# Patient Record
Sex: MALE | Race: WHITE | HISPANIC OR LATINO
[De-identification: names, ages, dates, MRNs, and addresses within clinical notes are randomized per-mention and may not be internally consistent; named-entity substitution may affect disease eponyms.]

---

## 2020-04-04 ENCOUNTER — TRANSCRIPTION ENCOUNTER (OUTPATIENT)
Age: 40
End: 2020-04-04

## 2020-05-09 ENCOUNTER — TRANSCRIPTION ENCOUNTER (OUTPATIENT)
Age: 40
End: 2020-05-09

## 2022-08-18 ENCOUNTER — APPOINTMENT (OUTPATIENT)
Dept: ORTHOPEDIC SURGERY | Facility: CLINIC | Age: 42
End: 2022-08-18

## 2022-08-18 VITALS — HEIGHT: 70 IN | BODY MASS INDEX: 27.92 KG/M2 | WEIGHT: 195 LBS

## 2022-08-18 DIAGNOSIS — Z78.9 OTHER SPECIFIED HEALTH STATUS: ICD-10-CM

## 2022-08-18 DIAGNOSIS — Z00.00 ENCOUNTER FOR GENERAL ADULT MEDICAL EXAMINATION W/OUT ABNORMAL FINDINGS: ICD-10-CM

## 2022-08-18 DIAGNOSIS — M24.9 JOINT DERANGEMENT, UNSPECIFIED: ICD-10-CM

## 2022-08-18 PROCEDURE — 73080 X-RAY EXAM OF ELBOW: CPT | Mod: RT

## 2022-08-18 PROCEDURE — 99204 OFFICE O/P NEW MOD 45 MIN: CPT

## 2022-08-18 PROCEDURE — 99072 ADDL SUPL MATRL&STAF TM PHE: CPT
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
## 2022-08-18 NOTE — DISCUSSION/SUMMARY
[de-identified] : 41m s/p work injury 08.12.22 with left elbow pain/ bruising. \par 1) stat mri left elbow, eval distal biceps \par 2) cryotherapy, rest and activity modification\par 3) OOW\par 4) d/c use of  the sling\par 5) rtc after MRI \par \par Entered by Monica Temple acting as scribe.\par

## 2022-08-18 NOTE — PHYSICAL EXAM
[Left] : left elbow [] : no laceration/abrasion [FreeTextEntry9] : limited due to immoblization  [de-identified] : +hook test

## 2022-08-18 NOTE — WORK
[Sprain/Strain] : sprain/strain [Was the competent medical cause of the injury] : was the competent medical cause of the injury [Are consistent with the injury] : are consistent with the injury [Consistent with my objective findings] : consistent with my objective findings [Total] : total [Does not reveal pre-existing condition(s) that may affect treatment/prognosis] : does not reveal pre-existing condition(s) that may affect treatment/prognosis [Cannot return to work because ________] : cannot return to work because [unfilled] [Patient] : patient [No Rx restrictions] : No Rx restrictions. [I provided the services listed above] :  I provided the services listed above.

## 2022-08-24 ENCOUNTER — FORM ENCOUNTER (OUTPATIENT)
Age: 42
End: 2022-08-24

## 2022-08-24 ENCOUNTER — APPOINTMENT (OUTPATIENT)
Dept: ORTHOPEDIC SURGERY | Facility: CLINIC | Age: 42
End: 2022-08-24

## 2022-08-25 ENCOUNTER — APPOINTMENT (OUTPATIENT)
Dept: MRI IMAGING | Facility: CLINIC | Age: 42
End: 2022-08-25

## 2022-08-25 PROCEDURE — 73221 MRI JOINT UPR EXTREM W/O DYE: CPT | Mod: LT

## 2022-08-25 PROCEDURE — 99072 ADDL SUPL MATRL&STAF TM PHE: CPT

## 2022-08-31 ENCOUNTER — APPOINTMENT (OUTPATIENT)
Dept: ORTHOPEDIC SURGERY | Facility: CLINIC | Age: 42
End: 2022-08-31

## 2022-08-31 PROCEDURE — 99214 OFFICE O/P EST MOD 30 MIN: CPT

## 2022-08-31 PROCEDURE — 99072 ADDL SUPL MATRL&STAF TM PHE: CPT
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
## 2022-08-31 NOTE — DISCUSSION/SUMMARY
[de-identified] : 41m s/p work injury 08.12.22 with partial tear distal biceps left elbow\par 1) start physical therapy \par 2) cryotherapy, rest and activity modification\par 3) remain oow\par 4) rtc 4 weeks\par \par \par Entered by Monica Temple acting as scribe.\par

## 2022-08-31 NOTE — PHYSICAL EXAM
[Left] : left elbow [] : no laceration/abrasion [FreeTextEntry9] : limited due to immoblization  [de-identified] : +hook test

## 2022-09-28 ENCOUNTER — APPOINTMENT (OUTPATIENT)
Dept: ORTHOPEDIC SURGERY | Facility: CLINIC | Age: 42
End: 2022-09-28

## 2022-09-28 VITALS — HEIGHT: 70 IN | BODY MASS INDEX: 27.92 KG/M2 | WEIGHT: 195 LBS

## 2022-09-28 PROCEDURE — 99214 OFFICE O/P EST MOD 30 MIN: CPT

## 2022-09-28 PROCEDURE — 99072 ADDL SUPL MATRL&STAF TM PHE: CPT

## 2022-09-28 NOTE — WORK
[Sprain/Strain] : sprain/strain [Was the competent medical cause of the injury] : was the competent medical cause of the injury [Are consistent with the injury] : are consistent with the injury [Consistent with my objective findings] : consistent with my objective findings [Does not reveal pre-existing condition(s) that may affect treatment/prognosis] : does not reveal pre-existing condition(s) that may affect treatment/prognosis [Patient] : patient [No Rx restrictions] : No Rx restrictions. [I provided the services listed above] :  I provided the services listed above. [Partial] : partial

## 2022-09-28 NOTE — PHYSICAL EXAM
[Left] : left elbow [] : palpable distal biceps [Pain with Pronation] : pain with pronation [Pain with Supination] : pain with supination [de-identified] : +hook test

## 2022-09-28 NOTE — DISCUSSION/SUMMARY
[de-identified] : 41m s/p work injury 08.12.22 with partial tear distal biceps left elbow. pt presently working light duty. \par 1) c/w physical therapy\par 2) cryotherapy, rest and activity modification\par 3) c/w light duty\par 4) rtc 6 weeks\par \par Entered by Monica Temple acting as scribe.\par
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
## 2022-11-02 ENCOUNTER — APPOINTMENT (OUTPATIENT)
Dept: ORTHOPEDIC SURGERY | Facility: CLINIC | Age: 42
End: 2022-11-02

## 2022-11-02 VITALS — BODY MASS INDEX: 27.92 KG/M2 | WEIGHT: 195 LBS | HEIGHT: 70 IN

## 2022-11-02 PROCEDURE — 99072 ADDL SUPL MATRL&STAF TM PHE: CPT

## 2022-11-02 PROCEDURE — 99214 OFFICE O/P EST MOD 30 MIN: CPT

## 2022-11-02 NOTE — DISCUSSION/SUMMARY
[de-identified] : 41m s/p work injury 08.12.22 with partial tear distal biceps left elbow. pt presently working light duty. \par 1) c/w physical therapy / hep\par 2) cryotherapy, rest and activity modification\par 3) c/w light duty\par 4) rtc 6 weeks\par \par Entered by Monica Temple acting as scribe.\par

## 2022-11-02 NOTE — PHYSICAL EXAM
[Pain with Pronation] : pain with pronation [Pain with Supination] : pain with supination [] : light touch intact [Left] : left elbow [de-identified] : +hook test
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
## 2022-12-14 ENCOUNTER — APPOINTMENT (OUTPATIENT)
Dept: ORTHOPEDIC SURGERY | Facility: CLINIC | Age: 42
End: 2022-12-14

## 2022-12-14 VITALS — WEIGHT: 190 LBS | HEIGHT: 70 IN | BODY MASS INDEX: 27.2 KG/M2

## 2022-12-14 PROCEDURE — 99072 ADDL SUPL MATRL&STAF TM PHE: CPT

## 2022-12-14 PROCEDURE — 99214 OFFICE O/P EST MOD 30 MIN: CPT
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
## 2022-12-14 NOTE — WORK
[Partial] : partial [Does not reveal pre-existing condition(s) that may affect treatment/prognosis] : does not reveal pre-existing condition(s) that may affect treatment/prognosis [Patient] : patient [No Rx restrictions] : No Rx restrictions. [I provided the services listed above] :  I provided the services listed above.

## 2022-12-14 NOTE — PHYSICAL EXAM
[Pain with Pronation] : pain with pronation [Pain with Supination] : pain with supination [Left] : left elbow [] : no laceration/abrasion [de-identified] : +hook test

## 2022-12-14 NOTE — DISCUSSION/SUMMARY
[de-identified] : 41m s/p work injury 08.12.22 with partial tear distal biceps left elbow. pt presently working light duty. \par 1) c/w physical therapy / hep\par 2) cryotherapy, rest and activity modification\par 3) c/w light duty\par 4) will consider updated MRI to eval distal biceps\par 5) rtc 4 weeks\par \par \par Entered by Monica Temple acting as scribe.\par

## 2023-01-11 ENCOUNTER — APPOINTMENT (OUTPATIENT)
Dept: ORTHOPEDIC SURGERY | Facility: CLINIC | Age: 43
End: 2023-01-11
Payer: OTHER MISCELLANEOUS

## 2023-01-11 ENCOUNTER — FORM ENCOUNTER (OUTPATIENT)
Age: 43
End: 2023-01-11

## 2023-01-11 VITALS — BODY MASS INDEX: 27.2 KG/M2 | HEIGHT: 70 IN | WEIGHT: 190 LBS

## 2023-01-11 PROCEDURE — 99214 OFFICE O/P EST MOD 30 MIN: CPT

## 2023-01-11 PROCEDURE — 99072 ADDL SUPL MATRL&STAF TM PHE: CPT
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
## 2023-01-11 NOTE — PHYSICAL EXAM
[Pain with Pronation] : pain with pronation [Pain with Supination] : pain with supination [Left] : left elbow [] : no laceration/abrasion [de-identified] : +hook test

## 2023-01-11 NOTE — DISCUSSION/SUMMARY
[de-identified] : 41m s/p work injury 08.12.22 with partial tear distal biceps left elbow. pt presently working light duty. \par 1) c/w physical therapy / hep\par 2) cryotherapy, rest and activity modification\par 3) c/w light duty\par 4) will obtain update MRI to eval worsening of tear.\par \par \par Entered by Liya Alfaro PA-C, acting as scribe for Dr. Carpio.\par

## 2023-01-12 ENCOUNTER — APPOINTMENT (OUTPATIENT)
Dept: MRI IMAGING | Facility: CLINIC | Age: 43
End: 2023-01-12
Payer: OTHER MISCELLANEOUS

## 2023-01-12 PROCEDURE — 73221 MRI JOINT UPR EXTREM W/O DYE: CPT | Mod: LT

## 2023-01-12 PROCEDURE — 99072 ADDL SUPL MATRL&STAF TM PHE: CPT

## 2023-01-18 ENCOUNTER — APPOINTMENT (OUTPATIENT)
Dept: ORTHOPEDIC SURGERY | Facility: CLINIC | Age: 43
End: 2023-01-18
Payer: OTHER MISCELLANEOUS

## 2023-01-18 VITALS — WEIGHT: 190 LBS | HEIGHT: 70 IN | BODY MASS INDEX: 27.2 KG/M2

## 2023-01-18 PROCEDURE — 99072 ADDL SUPL MATRL&STAF TM PHE: CPT

## 2023-01-18 PROCEDURE — 99214 OFFICE O/P EST MOD 30 MIN: CPT
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
## 2023-01-18 NOTE — PHYSICAL EXAM
[Pain with Pronation] : pain with pronation [Pain with Supination] : pain with supination [] : light touch intact [Left] : left elbow [de-identified] : +hook test

## 2023-01-18 NOTE — DATA REVIEWED
[MRI] : MRI [Left] : left [Elbow] : elbow [Report was reviewed and noted in the chart] : The report was reviewed and noted in the chart [I independently reviewed and interpreted images and report] : I independently reviewed and interpreted images and report [I reviewed the films/CD] : I reviewed the films/CD [FreeTextEntry1] : 01.12.23\par 1. Moderate grade partial tearing involving the distal 2 cm of the biceps tendon insertion with mild delamination and surrounding bursitis and mild remodeling and reactive marrow signal changes in the radial tuberosity without tendon discontinuity, retraction or muscle atrophy. Compared to prior exam, there is progression of thickening with increased T2-weighted signal within the tendon with similar appearing bursitis and delamination and mild progression of remodeling and reactive marrow signal changes in the radial tuberosity. There is also a bone island in the dorsal aspect of the radial neck. Correlation with plain films and physical exam is recommended and clinical follow up is recommended.\par 2. Mild new ulnar collateral ligament sprain, common flexor tendon strain and mild subcutaneous edema in the dorsal medial aspect of the elbow overlying the ulnar nerve superficial to the cubital tunnel.\par 3. Patient motion degrades image quality on multiple sequences.

## 2023-01-18 NOTE — DISCUSSION/SUMMARY
[de-identified] : 41m s/p work injury 08.12.22 with partial tear distal biceps left elbow and progression of the tear seen on updated MRI. pt presently working light duty. r/b/a of surgical intervention and conservative management discussed. pt given to opportunity to ask all questions and all questions were answered.   Pt would like to proceed with surgery as discussed. \par \par Will plan for left distal biceps tendon repair\par \par The patient was advised of the diagnosis. The natural history of the pathology was explained in full to the patient in layman's terms. All questions were answered. The risks and benefits of surgical and non-surgical treatment alternatives were explained in full to the patient. \par The patient demonstrated a full understanding of the surgical and non-surgical options. The risks of surgery were outlined in full to the patient including but not limited to bleeding, scarring, infection, sepsis, neurologic injury, vascular injury, failure to resolve symptoms, symptom recurrence, the need for further surgery, non-healing, wound breakdown, deep vein thrombosis, pulmonary embolism, spontaneous osteonecrosis, anesthesia complications and even death. The patient understood all the risks and accepted them and understood that other complications could occur that are not mentioned above. The intraoperative plan, post-operative plan, post-operative expectations and limitations were explained in full. Expectations from non-surgical treatment were explained in full as well. The patient demonstrated a complete understanding of the treatment alternatives and requested the above-mentioned procedure. This will be scheduled accordingly\par  \par The advantages, disadvantages, complications and alternatives of continued non-operative treatment versus operative treatment were discussed with the patient. We specifically discussed the risks of bleeding, infection, damage to neurovascular structures, failure of wound healing, wound dehiscence, scaring, failure of repair, need for revision surgery, elbow pain, elbow stiffness, elbow arthritis and complications of surgery and anesthesia in general including deep venous thrombosis, pulmonary embolism, myocardial infarction, stroke, loss of limb and death. The patient understood and agreed to proceed.\par \par Entered by Monica Temple acting as scribe. \par \par \par

## 2023-02-07 ENCOUNTER — FORM ENCOUNTER (OUTPATIENT)
Age: 43
End: 2023-02-07

## 2023-02-13 ENCOUNTER — APPOINTMENT (OUTPATIENT)
Dept: ORTHOPEDIC SURGERY | Facility: CLINIC | Age: 43
End: 2023-02-13

## 2023-02-16 ENCOUNTER — APPOINTMENT (OUTPATIENT)
Dept: ORTHOPEDIC SURGERY | Facility: CLINIC | Age: 43
End: 2023-02-16
Payer: OTHER MISCELLANEOUS

## 2023-02-16 PROCEDURE — 99072 ADDL SUPL MATRL&STAF TM PHE: CPT

## 2023-02-16 PROCEDURE — L3670: CPT | Mod: LT

## 2023-02-20 ENCOUNTER — APPOINTMENT (OUTPATIENT)
Dept: ORTHOPEDIC SURGERY | Facility: AMBULATORY SURGERY CENTER | Age: 43
End: 2023-02-20
Payer: OTHER MISCELLANEOUS

## 2023-02-20 PROCEDURE — 24342 REPAIR OF RUPTURED TENDON: CPT | Mod: AS,LT

## 2023-02-20 PROCEDURE — 24342 REPAIR OF RUPTURED TENDON: CPT | Mod: LT

## 2023-02-20 RX ORDER — OXYCODONE AND ACETAMINOPHEN 5; 325 MG/1; MG/1
5-325 TABLET ORAL
Qty: 40 | Refills: 0 | Status: ACTIVE | COMMUNITY
Start: 2023-02-20 | End: 1900-01-01

## 2023-03-01 ENCOUNTER — APPOINTMENT (OUTPATIENT)
Dept: ORTHOPEDIC SURGERY | Facility: CLINIC | Age: 43
End: 2023-03-01
Payer: OTHER MISCELLANEOUS

## 2023-03-01 VITALS — WEIGHT: 190 LBS | HEIGHT: 70 IN | BODY MASS INDEX: 27.2 KG/M2

## 2023-03-01 PROCEDURE — 73070 X-RAY EXAM OF ELBOW: CPT | Mod: LT

## 2023-03-01 PROCEDURE — 99024 POSTOP FOLLOW-UP VISIT: CPT
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
## 2023-03-01 NOTE — PHYSICAL EXAM
[Pain with Pronation] : pain with pronation [Pain with Supination] : pain with supination [de-identified] : +hook test [Components well fixed, in good position] : Components well fixed, in good position [Left] : left elbow [] : no sero-sanguinous drainage

## 2023-03-01 NOTE — WORK
[Does not reveal pre-existing condition(s) that may affect treatment/prognosis] : does not reveal pre-existing condition(s) that may affect treatment/prognosis [Patient] : patient [No Rx restrictions] : No Rx restrictions. [I provided the services listed above] :  I provided the services listed above. [Total] : total [Cannot return to work because ________] : cannot return to work because [unfilled]

## 2023-03-01 NOTE — DISCUSSION/SUMMARY
[de-identified] : 42m s/p left distal biceps repair 02.20.23\par 1) start physical therapy - protocol provided\par 2) reviewed post-op precautions and procedures.\par 3) cryotherapy, rest and activity modification\par 4) OOW \par 5) rtc 4 weeks\par \par Entered by Monica Temple acting as scribe.\par

## 2023-04-05 ENCOUNTER — APPOINTMENT (OUTPATIENT)
Dept: ORTHOPEDIC SURGERY | Facility: CLINIC | Age: 43
End: 2023-04-05
Payer: OTHER MISCELLANEOUS

## 2023-04-05 VITALS — BODY MASS INDEX: 27.2 KG/M2 | HEIGHT: 70 IN | WEIGHT: 190 LBS

## 2023-04-05 PROCEDURE — 99024 POSTOP FOLLOW-UP VISIT: CPT

## 2023-04-05 NOTE — WORK
[Does not reveal pre-existing condition(s) that may affect treatment/prognosis] : does not reveal pre-existing condition(s) that may affect treatment/prognosis [Cannot return to work because ________] : cannot return to work because [unfilled] [Patient] : patient [No Rx restrictions] : No Rx restrictions. [I provided the services listed above] :  I provided the services listed above.

## 2023-05-17 ENCOUNTER — APPOINTMENT (OUTPATIENT)
Dept: ORTHOPEDIC SURGERY | Facility: CLINIC | Age: 43
End: 2023-05-17
Payer: OTHER MISCELLANEOUS

## 2023-05-17 PROCEDURE — 99024 POSTOP FOLLOW-UP VISIT: CPT

## 2023-05-17 NOTE — DISCUSSION/SUMMARY
[de-identified] : 42m s/p left distal biceps repair 02.20.23. Full ROM, strength and sensation improving\par 1) continue with modified duty for work\par 2) c/w pt and hep \par 3) cryotherapy, rest and activity modification\par 4) rtc 6 weeks\par \par Entered by Monica Temple acting as scribe.\par Dr. Carpio-\par The documentation recorded by the scribe accurately reflects the service I personally performed and the decisions made by me.
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
## 2023-05-17 NOTE — PHYSICAL EXAM
[Left] : left elbow [] : no sero-sanguinous drainage [NL (150)] : flexion 150 degrees [NL (0)] : extension 0 degrees [NL (90)] : supination 90 degrees

## 2023-05-17 NOTE — PHYSICAL EXAM
[Components well fixed, in good position] : Components well fixed, in good position [Left] : left elbow [] : no sero-sanguinous drainage
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
## 2023-05-17 NOTE — DISCUSSION/SUMMARY
[de-identified] : 42m s/p left distal biceps repair 02.20.23\par 1) c/w physical therapy as per protocol \par 2) reviewed post-op precautions and procedures.\par 3) cryotherapy, rest and activity modification\par 4) OOW \par 5) rtc 6 weeks\par \par Entered by Monica Temple acting as scribe.\par Dr. Carpio-\par The documentation recorded by the scribe accurately reflects the service I personally performed and the decisions made by me.

## 2023-05-17 NOTE — WORK
[Does not reveal pre-existing condition(s) that may affect treatment/prognosis] : does not reveal pre-existing condition(s) that may affect treatment/prognosis [Patient] : patient [No Rx restrictions] : No Rx restrictions. [I provided the services listed above] :  I provided the services listed above. [Partial] : partial

## 2023-06-28 ENCOUNTER — APPOINTMENT (OUTPATIENT)
Dept: ORTHOPEDIC SURGERY | Facility: CLINIC | Age: 43
End: 2023-06-28
Payer: OTHER MISCELLANEOUS

## 2023-06-28 VITALS — BODY MASS INDEX: 27.2 KG/M2 | WEIGHT: 190 LBS | HEIGHT: 70 IN

## 2023-06-28 PROCEDURE — 99214 OFFICE O/P EST MOD 30 MIN: CPT
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
## 2023-06-28 NOTE — PHYSICAL EXAM
[Left] : left elbow [] : intact incisions with sutures in place [NL (150)] : flexion 150 degrees [NL (0)] : extension 0 degrees [NL (90)] : supination 90 degrees

## 2023-06-28 NOTE — DISCUSSION/SUMMARY
[de-identified] : 42m s/p left distal biceps repair 02.20.23. Full ROM, strength and sensation improving\par 1) continue with modified duty for work\par 2) c/w pt and hep \par 3) cryotherapy, rest and activity modification\par 4) rtc 6-8 weeks\par \par Entered by Monica Temple acting as scribe.\par Dr. Carpio-\par The documentation recorded by the scribe accurately reflects the service I personally performed and the decisions made by me.

## 2023-08-30 ENCOUNTER — APPOINTMENT (OUTPATIENT)
Dept: ORTHOPEDIC SURGERY | Facility: CLINIC | Age: 43
End: 2023-08-30
Payer: OTHER MISCELLANEOUS

## 2023-08-30 PROCEDURE — 99214 OFFICE O/P EST MOD 30 MIN: CPT
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
## 2023-08-30 NOTE — PHYSICAL EXAM
[Left] : left elbow [NL (150)] : flexion 150 degrees [NL (0)] : extension 0 degrees [NL (90)] : supination 90 degrees [] : no sero-sanguinous drainage

## 2023-08-30 NOTE — WORK
[Partial] : partial [Does not reveal pre-existing condition(s) that may affect treatment/prognosis] : does not reveal pre-existing condition(s) that may affect treatment/prognosis [Patient] : patient [No Rx restrictions] : No Rx restrictions. [I provided the services listed above] :  I provided the services listed above. [Can return to work without limitations on ______] : can return to work without limitations on [unfilled]

## 2023-08-30 NOTE — DISCUSSION/SUMMARY
[de-identified] : 42m s/p left distal biceps repair 02.20.23. Full ROM, strength and sensation improving.  1) RTW Full duty as of 9/18/23.  2) c/w pt and hep  3) cryotherapy, rest and activity modification 4) rtc 2 months

## 2023-11-01 ENCOUNTER — APPOINTMENT (OUTPATIENT)
Dept: ORTHOPEDIC SURGERY | Facility: CLINIC | Age: 43
End: 2023-11-01
Payer: OTHER MISCELLANEOUS

## 2023-11-01 DIAGNOSIS — S46.212A STRAIN OF MUSCLE, FASCIA AND TENDON OF OTHER PARTS OF BICEPS, LEFT ARM, INITIAL ENCOUNTER: ICD-10-CM

## 2023-11-01 PROCEDURE — 99214 OFFICE O/P EST MOD 30 MIN: CPT
